# Patient Record
Sex: FEMALE | Race: ASIAN | NOT HISPANIC OR LATINO | Employment: FULL TIME | URBAN - METROPOLITAN AREA
[De-identification: names, ages, dates, MRNs, and addresses within clinical notes are randomized per-mention and may not be internally consistent; named-entity substitution may affect disease eponyms.]

---

## 2017-03-14 ENCOUNTER — OFFICE VISIT (OUTPATIENT)
Dept: FAMILY MEDICINE CLINIC | Facility: CLINIC | Age: 27
End: 2017-03-14

## 2017-03-14 VITALS
DIASTOLIC BLOOD PRESSURE: 60 MMHG | HEART RATE: 98 BPM | SYSTOLIC BLOOD PRESSURE: 100 MMHG | WEIGHT: 152 LBS | HEIGHT: 64 IN | TEMPERATURE: 98 F | OXYGEN SATURATION: 100 % | BODY MASS INDEX: 25.95 KG/M2

## 2017-03-14 DIAGNOSIS — M62.830 SPASM OF THORACIC BACK MUSCLE: Primary | ICD-10-CM

## 2017-03-14 PROCEDURE — 99203 OFFICE O/P NEW LOW 30 MIN: CPT | Performed by: FAMILY MEDICINE

## 2017-03-14 NOTE — PROGRESS NOTES
Subjective   Demi Weathers is a 26 y.o. female with   Chief Complaint   Patient presents with   • Back Pain   • Establish Care   .    History of Present Illness     Demi is a 26 yr old female that presents today as a new patient.  The health care questionnaire has been filled out and reviewed in its entirety on this date.  Demi is presently 15 weeks pregnant with her 3rd child.  Demi complains of having back pain in the midline.  This has been present for the last 2 yrs.  After having her 2nd child, she was actually doing some physical therapy exercises but this did not help.  She is a  and stands every day during work.  2 months ago, she was seen by a [facility] physician who suggested physical therapy.    Demi has been having a lot of reflux since being pregnant. She has been having pain in her gums.  She denies congestion at this time.      The following portions of the patient's history were reviewed and updated as appropriate: allergies, current medications, past family history, past medical history, past social history, past surgical history and problem list.    Review of Systems   Musculoskeletal: Back pain: thoracic spine pain.   All other systems reviewed and are negative.      Objective     Vitals:    03/14/17 1509   BP: 100/60   Pulse: 98   Temp: 98 °F (36.7 °C)   SpO2: 100%       No results found for this or any previous visit (from the past 168 hour(s)).    Physical Exam   Constitutional: She is oriented to person, place, and time. She appears well-developed and well-nourished.   HENT:   Head: Normocephalic and atraumatic.   Neck: Trachea normal, full passive range of motion without pain and phonation normal. No thyroid mass and no thyromegaly present.   Musculoskeletal: Normal range of motion. She exhibits tenderness. She exhibits no edema or deformity.        Thoracic back: She exhibits tenderness and spasm.    Thoracic spine with increase tenderness and minimal spasm at the T6-8  region.  FROM in the thoracic region.   Neurological: She is alert and oriented to person, place, and time. She has normal strength. No sensory deficit.   Skin: Skin is warm and dry.   Psychiatric: She has a normal mood and affect. Her speech is normal and behavior is normal. Judgment and thought content normal. Cognition and memory are normal.   Nursing note and vitals reviewed.      Assessment/Plan   Demi was seen today for back pain and establish care.    Diagnoses and all orders for this visit:    Spasm of thoracic back muscle  -     Ambulatory Referral to Physical Therapy          Scribed for Zeyad Shirley MD by Robert Colon. 03/14/2017    IZeyad MD personally performed the services described in this documentation, as scribed by Robert Colon in my presence, and it is both accurate and complete

## 2017-03-17 NOTE — PATIENT INSTRUCTIONS
Patient has been referred to Dr. Kristina ro of the  Chiropractic service.if this fails to offer improvement physical therapy will be considered. Return to this office on an as-needed basis.

## 2024-11-15 PROCEDURE — 87086 URINE CULTURE/COLONY COUNT: CPT | Performed by: PHYSICIAN ASSISTANT
